# Patient Record
(demographics unavailable — no encounter records)

---

## 2025-02-21 NOTE — ASSESSMENT
[FreeTextEntry1] : Abdominal Pain: The patient complains of abdominal pain. The patient is to avoid nonsteroidal anti-inflammatory drugs and aspirin.  I recommend a low FOD-MAP diet.  I recommend a trial of Dicyclomine 10 mg tablet PO 3 times a day PRN for the abdominal pain. Dyspepsia: The patient complains of dyspeptic symptoms.  The patient was advised to continue to abide by an anti-gas (low FOD-MAP) diet.  The patient was previously given a pamphlet for anti-gas (low FOD-MAP).  The patient and I reviewed the anti-gas (low FOD-MAP) diet at length again. The patient is to continue on a trial of Simethicone one tablet 4 times a day p.r.n. abdominal pain and gas. Constipation: The patient complains of constipation. I recommend a high-fiber diet. I recommend a trial of a probiotic such as Align once a day. I recommend a trial of Metamucil once a day for fiber supplementation. I recommend a trial of Miralax 1 packet once a day for the constipation. The patient agreed and will followup to reassess the symptoms.   Gastritis: The patient has a history of gastritis noted on prior upper endoscopy. The patient is to avoid nonsteroidal anti-inflammatory drugs and aspirin. I recommend a trial of Famotidine 40 mg twice a day for 3 months for the symptoms. Intestinal Metaplasia of the Stomach: The patient was found to have intestinal metaplasia of the stomach on prior upper endoscopy. The findings of intestinal metaplasia of the stomach have an increased risk of gastric cancer. The biopsies did not reveal dysplasia. I recommend a repeat upper endoscopy with mapping in 2 years to reassess for intestinal metaplasia and dysplasia unless symptomatic. The patient is aware of the increased risk of intestinal metaplasia and progression to stomach cancer. The patient agrees to follow-up. History of Colonic Polyp: The patient was found to have colonic polyps on prior colonoscopy. I recommend a repeat colonoscopy in 4 years to reassess for colonic polyps pending patient's health unless symptomatic. The patient agreed and will follow up for the procedure. Internal Hemorrhoids: The patient is to consider starting a trial of Anusol H. C. suppositories one per rectum nightly and Anusol HC2.5% cream apply to affected area twice a day p.r.n. hemorrhoidal bleeding or pain. I also recommend a trial of Calmoseptine cream apply to affected area twice a day for hemorrhoidal discomfort. I recommend Tucks pads for the hemorrhoids. I recommend Sitz baths twice a day for the hemorrhoids. I recommend avoid wearing tight underwear and use boxers. I recommend avoid touching the perineal area. The patient agreed to followup. I recommend a repeat colonoscopy in 4 years to reassess for colonic polyps unless symptomatic. The patient agreed and will follow up for the procedure. Prior ER Evaluation: The patient was previously evaluated at the [facility] [place] emergency room for abdominal pain. The patient had blood work and imaging studies to assess the symptoms. I reviewed the blood work and imaging studies performed in the emergency room. Abnormal Imaging Study: The CAT scan of the abdomen and pelvis with IV contrast performed on March 17, 2023 revealed no evidence of small bowel obstruction or active bowel inflammation, normal appendix and a small fat-containing inguinal hernias right greater than left. Bilateral Inguinal hernias: The CAT scan of the abdomen and pelvis with IV contrast performed on March 17, 2023 revealed no evidence of small bowel obstruction or active bowel inflammation, normal appendix and a small fat-containing inguinal hernias right greater than left. The patient was evaluated by Dr. Sahil Traylor on March 20, 2023 for abdominal tenderness and bilateral small fat-containing inguinal hernias. The patient was advised to follow-up with the general surgeon, Dr. Traylor regarding the bilateral small fat-containing inguinal hernias. Imaging Study: I recommend an imaging study to assess the symptoms. I recommend an abdominal and pelvic ultrasound to assess the abdominal pain. Follow-up: The patient is to follow-up in the office in 3 months to reassess the symptoms. The patient was told to call the office if any further problems.

## 2025-02-21 NOTE — HISTORY OF PRESENT ILLNESS
[FreeTextEntry1] : The colonoscopy to the terminal ileum performed at the [facility] at [place] GI endoscopy suite on May 16, 2023 revealed a transverse colon polyp and moderate sized internal hemorrhoids.  The colonic polyp was completely removed with a jumbo biopsy forcep.  There were no other polyps, masses, diverticulosis, AVMs or colitis noted.  The colonic pathology performed on May 16, 2023 revealed a tubular adenoma (transverse colon polyp).  \par   [de-identified] : The CAT scan of the abdomen pelvis with IV contrast performed on March 17, 2023 revealed no evidence of small bowel obstruction or active bowel inflammation, normal appendix and a small fat-containing inguinal hernias right greater than left.  [de-identified] : The abdominal ultrasound performed on March 24, 2023 revealed no evidence of cholelithiasis and no dilatation of the biliary tree is noted and a 1.8 x 0.9 x 1.0 cm in right renal cyst

## 2025-05-16 NOTE — HISTORY OF PRESENT ILLNESS
[FreeTextEntry1] : The colonoscopy to the terminal ileum performed at the [facility] [place] GI endoscopy suite on May 16, 2023 revealed a transverse colon polyp and moderate sized internal hemorrhoids.  The colonic polyp was completely removed with a jumbo biopsy forcep.  There were no other polyps, masses, diverticulosis, AVMs or colitis noted.  The colonic pathology performed on May 16, 2023 revealed a tubular adenoma (transverse colon polyp).  \par   [de-identified] : The CAT scan of the abdomen pelvis with IV contrast performed on March 17, 2023 revealed no evidence of small bowel obstruction or active bowel inflammation, normal appendix and a small fat-containing inguinal hernias right greater than left.  [de-identified] : The abdominal ultrasound performed on March 10, 2025 revealed gallbladder adenomyomatosis. The gallbladder is otherwise unremarkable without stones, no renal calculus or hydronephrosis and the pancreas was poorly visualized on this examination. The pelvic ultrasound performed on March 10, 2025 revealed an under distended urinary bladder limiting evaluation and the prostate gland was within normal limits for size with an estimated volume of 21 cc.  The abdominal ultrasound performed on March 24, 2023 revealed no evidence of cholelithiasis and no dilatation of the biliary tree is noted and a 1.8 x 0.9 x 1.0 cm in right renal cyst

## 2025-05-16 NOTE — ASSESSMENT
[FreeTextEntry1] : Abdominal Pain: The patient complains of abdominal pain. The patient is to avoid nonsteroidal anti-inflammatory drugs and aspirin.  I recommend a low FOD-MAP diet.  I recommend a trial of Dicyclomine 10 mg tablet PO 3 times a day PRN for the abdominal pain.  I recommend a trial of famotidine 40 mg twice a day x 3 months for the symptoms. Dyspepsia: The patient complains of dyspeptic symptoms.  The patient was advised to continue to abide by an anti-gas (low FOD-MAP) diet.  The patient was previously given a pamphlet for anti-gas (low FOD-MAP).  The patient and I reviewed the anti-gas (low FOD-MAP) diet at length again. The patient is to continue on a trial of Simethicone one tablet 4 times a day p.r.n. abdominal pain and gas. Anal Pruritus: The patient had episodes of anal pruritus.  The etiology of the anal pruritus is unclear.  I recommend a trial of Anusol HC suppositories one per rectum QHS and Anusol HC 2.5% cream apply to affected area twice a day PRN hemorrhoidal bleeding or pain.  I recommend a trial of Calmoseptine cream apply to affected area twice a day for rectal discomfort. I recommend Tucks pads for the hemorrhoids.  I recommend starting Sitz baths twice a day for the hemorrhoids.  I recommend avoid wearing tight underwear and use boxers. I recommend avoid touching the perineal area. If the symptoms persist, The patient agrees and will follow up to assess the symptoms. Gastritis: The patient has a history of gastritis noted on prior upper endoscopy. The patient is to avoid nonsteroidal anti-inflammatory drugs and aspirin. I recommend a trial of Famotidine 40 mg twice a day for 3 months for the symptoms. Intestinal Metaplasia of the Stomach: The patient was found to have intestinal metaplasia of the stomach on prior upper endoscopy. The findings of intestinal metaplasia of the stomach have an increased risk of gastric cancer. The biopsies did not reveal dysplasia. I recommend a repeat upper endoscopy with mapping in 2 years to reassess for intestinal metaplasia and dysplasia unless symptomatic. The patient is aware of the increased risk of intestinal metaplasia and progression to stomach cancer. The patient agrees to follow-up. History of Colonic Polyp: The patient was found to have colonic polyps on prior colonoscopy. I recommend a repeat colonoscopy in 4 years to reassess for colonic polyps pending patient's health unless symptomatic. The patient agreed and will follow up for the procedure. Internal Hemorrhoids: The patient is to consider starting a trial of Anusol H. C. suppositories one per rectum nightly and Anusol HC2.5% cream apply to affected area twice a day p.r.n. hemorrhoidal bleeding or pain. I also recommend a trial of Calmoseptine cream apply to affected area twice a day for hemorrhoidal discomfort. I recommend Tucks pads for the hemorrhoids. I recommend Sitz baths twice a day for the hemorrhoids. I recommend avoid wearing tight underwear and use boxers. I recommend avoid touching the perineal area. The patient agreed to followup. I recommend a repeat colonoscopy in 4 years to reassess for colonic polyps unless symptomatic. The patient agreed and will follow up for the procedure. Prior ER Evaluation: The patient was previously evaluated at the [facility] [place] emergency room for abdominal pain. The patient had blood work and imaging studies to assess the symptoms. I reviewed the blood work and imaging studies performed in the emergency room. Abnormal Imaging Study: The CAT scan of the abdomen and pelvis with IV contrast performed on March 17, 2023 revealed no evidence of small bowel obstruction or active bowel inflammation, normal appendix and a small fat-containing inguinal hernias right greater than left. Bilateral Inguinal hernias: The CAT scan of the abdomen and pelvis with IV contrast performed on March 17, 2023 revealed no evidence of small bowel obstruction or active bowel inflammation, normal appendix and a small fat-containing inguinal hernias right greater than left. The patient was evaluated by Dr. Sahil Traylor on March 20, 2023 for abdominal tenderness and bilateral small fat-containing inguinal hernias. The patient was advised to follow-up with the general surgeon, Dr. Traylor regarding the bilateral small fat-containing inguinal hernias. Follow-up: The patient is to follow-up in the office in 6 months to reassess the symptoms. The patient was told to call the office if any further problems.